# Patient Record
Sex: FEMALE | Race: ASIAN | NOT HISPANIC OR LATINO | Employment: OTHER | URBAN - METROPOLITAN AREA
[De-identification: names, ages, dates, MRNs, and addresses within clinical notes are randomized per-mention and may not be internally consistent; named-entity substitution may affect disease eponyms.]

---

## 2017-04-29 ENCOUNTER — APPOINTMENT (OUTPATIENT)
Dept: MRI IMAGING | Facility: CLINIC | Age: 52
End: 2017-04-29
Attending: EMERGENCY MEDICINE
Payer: COMMERCIAL

## 2017-04-29 ENCOUNTER — HOSPITAL ENCOUNTER (EMERGENCY)
Facility: CLINIC | Age: 52
Discharge: HOME | End: 2017-04-29
Attending: EMERGENCY MEDICINE | Admitting: EMERGENCY MEDICINE
Payer: COMMERCIAL

## 2017-04-29 VITALS
TEMPERATURE: 97.6 F | RESPIRATION RATE: 16 BRPM | DIASTOLIC BLOOD PRESSURE: 50 MMHG | OXYGEN SATURATION: 95 % | HEART RATE: 84 BPM | SYSTOLIC BLOOD PRESSURE: 97 MMHG

## 2017-04-29 DIAGNOSIS — R42 VERTIGO: ICD-10-CM

## 2017-04-29 DIAGNOSIS — H72.92 PERFORATION OF TYMPANIC MEMBRANE, LEFT: ICD-10-CM

## 2017-04-29 LAB
ANION GAP SERPL CALCULATED.3IONS-SCNC: 8 MMOL/L (ref 3–14)
BASOPHILS # BLD AUTO: 0 10E9/L (ref 0–0.2)
BASOPHILS NFR BLD AUTO: 0.2 %
BUN SERPL-MCNC: 12 MG/DL (ref 7–30)
CALCIUM SERPL-MCNC: 8.9 MG/DL (ref 8.5–10.1)
CHLORIDE SERPL-SCNC: 108 MMOL/L (ref 94–109)
CO2 SERPL-SCNC: 26 MMOL/L (ref 20–32)
CREAT SERPL-MCNC: 0.45 MG/DL (ref 0.52–1.04)
DIFFERENTIAL METHOD BLD: ABNORMAL
EOSINOPHIL # BLD AUTO: 0 10E9/L (ref 0–0.7)
EOSINOPHIL NFR BLD AUTO: 0.2 %
ERYTHROCYTE [DISTWIDTH] IN BLOOD BY AUTOMATED COUNT: 12.5 % (ref 10–15)
GFR SERPL CREATININE-BSD FRML MDRD: ABNORMAL ML/MIN/1.7M2
GLUCOSE SERPL-MCNC: 124 MG/DL (ref 70–99)
HCT VFR BLD AUTO: 38.9 % (ref 35–47)
HGB BLD-MCNC: 13.7 G/DL (ref 11.7–15.7)
IMM GRANULOCYTES # BLD: 0 10E9/L (ref 0–0.4)
IMM GRANULOCYTES NFR BLD: 0.3 %
LYMPHOCYTES # BLD AUTO: 1.3 10E9/L (ref 0.8–5.3)
LYMPHOCYTES NFR BLD AUTO: 22.2 %
MCH RBC QN AUTO: 33.5 PG (ref 26.5–33)
MCHC RBC AUTO-ENTMCNC: 35.2 G/DL (ref 31.5–36.5)
MCV RBC AUTO: 95 FL (ref 78–100)
MONOCYTES # BLD AUTO: 0.2 10E9/L (ref 0–1.3)
MONOCYTES NFR BLD AUTO: 4 %
NEUTROPHILS # BLD AUTO: 4.4 10E9/L (ref 1.6–8.3)
NEUTROPHILS NFR BLD AUTO: 73.1 %
NRBC # BLD AUTO: 0 10*3/UL
NRBC BLD AUTO-RTO: 0 /100
PLATELET # BLD AUTO: 223 10E9/L (ref 150–450)
POTASSIUM SERPL-SCNC: 3.7 MMOL/L (ref 3.4–5.3)
RBC # BLD AUTO: 4.09 10E12/L (ref 3.8–5.2)
SODIUM SERPL-SCNC: 142 MMOL/L (ref 133–144)
WBC # BLD AUTO: 6 10E9/L (ref 4–11)

## 2017-04-29 PROCEDURE — 99285 EMERGENCY DEPT VISIT HI MDM: CPT | Mod: 25

## 2017-04-29 PROCEDURE — 25000132 ZZH RX MED GY IP 250 OP 250 PS 637: Performed by: EMERGENCY MEDICINE

## 2017-04-29 PROCEDURE — 25000128 H RX IP 250 OP 636: Performed by: EMERGENCY MEDICINE

## 2017-04-29 PROCEDURE — 85025 COMPLETE CBC W/AUTO DIFF WBC: CPT | Performed by: EMERGENCY MEDICINE

## 2017-04-29 PROCEDURE — 70549 MR ANGIOGRAPH NECK W/O&W/DYE: CPT

## 2017-04-29 PROCEDURE — 96361 HYDRATE IV INFUSION ADD-ON: CPT

## 2017-04-29 PROCEDURE — 80048 BASIC METABOLIC PNL TOTAL CA: CPT | Performed by: EMERGENCY MEDICINE

## 2017-04-29 PROCEDURE — A9585 GADOBUTROL INJECTION: HCPCS | Performed by: EMERGENCY MEDICINE

## 2017-04-29 PROCEDURE — 70553 MRI BRAIN STEM W/O & W/DYE: CPT

## 2017-04-29 PROCEDURE — 25500064 ZZH RX 255 OP 636: Performed by: EMERGENCY MEDICINE

## 2017-04-29 PROCEDURE — 25000125 ZZHC RX 250: Performed by: EMERGENCY MEDICINE

## 2017-04-29 PROCEDURE — 96374 THER/PROPH/DIAG INJ IV PUSH: CPT | Mod: 59

## 2017-04-29 PROCEDURE — 70544 MR ANGIOGRAPHY HEAD W/O DYE: CPT | Mod: XS

## 2017-04-29 PROCEDURE — 96375 TX/PRO/DX INJ NEW DRUG ADDON: CPT

## 2017-04-29 PROCEDURE — 25000131 ZZH RX MED GY IP 250 OP 636 PS 637: Performed by: EMERGENCY MEDICINE

## 2017-04-29 RX ORDER — GADOBUTROL 604.72 MG/ML
10 INJECTION INTRAVENOUS ONCE
Status: COMPLETED | OUTPATIENT
Start: 2017-04-29 | End: 2017-04-29

## 2017-04-29 RX ORDER — DIAZEPAM 10 MG/2ML
2.5 INJECTION, SOLUTION INTRAMUSCULAR; INTRAVENOUS ONCE
Status: COMPLETED | OUTPATIENT
Start: 2017-04-29 | End: 2017-04-29

## 2017-04-29 RX ORDER — ONDANSETRON 4 MG/1
4 TABLET, ORALLY DISINTEGRATING ORAL EVERY 6 HOURS PRN
Qty: 10 TABLET | Refills: 0 | Status: SHIPPED | OUTPATIENT
Start: 2017-04-29 | End: 2017-05-02

## 2017-04-29 RX ORDER — MECLIZINE HYDROCHLORIDE 25 MG/1
25 TABLET ORAL ONCE
Status: COMPLETED | OUTPATIENT
Start: 2017-04-29 | End: 2017-04-29

## 2017-04-29 RX ORDER — DIAZEPAM 5 MG
5 TABLET ORAL EVERY 6 HOURS PRN
Qty: 20 TABLET | Refills: 0 | Status: SHIPPED | OUTPATIENT
Start: 2017-04-29

## 2017-04-29 RX ORDER — SCOLOPAMINE TRANSDERMAL SYSTEM 1 MG/1
1 PATCH, EXTENDED RELEASE TRANSDERMAL
Status: COMPLETED | OUTPATIENT
Start: 2017-04-29 | End: 2017-04-29

## 2017-04-29 RX ORDER — DIAZEPAM 5 MG
5 TABLET ORAL ONCE
Status: COMPLETED | OUTPATIENT
Start: 2017-04-29 | End: 2017-04-29

## 2017-04-29 RX ORDER — ONDANSETRON 2 MG/ML
4 INJECTION INTRAMUSCULAR; INTRAVENOUS ONCE
Status: COMPLETED | OUTPATIENT
Start: 2017-04-29 | End: 2017-04-29

## 2017-04-29 RX ADMIN — GADOBUTROL 10 ML: 604.72 INJECTION INTRAVENOUS at 18:25

## 2017-04-29 RX ADMIN — ONDANSETRON 4 MG: 2 SOLUTION INTRAMUSCULAR; INTRAVENOUS at 16:34

## 2017-04-29 RX ADMIN — DIAZEPAM 5 MG: 5 TABLET ORAL at 20:02

## 2017-04-29 RX ADMIN — DIAZEPAM 2.5 MG: 5 INJECTION, SOLUTION INTRAMUSCULAR; INTRAVENOUS at 16:39

## 2017-04-29 RX ADMIN — SODIUM CHLORIDE 1000 ML: 9 INJECTION, SOLUTION INTRAVENOUS at 16:34

## 2017-04-29 RX ADMIN — MECLIZINE HYDROCHLORIDE 25 MG: 25 TABLET ORAL at 19:16

## 2017-04-29 RX ADMIN — SCOPALAMINE 1 PATCH: 1 PATCH, EXTENDED RELEASE TRANSDERMAL at 21:03

## 2017-04-29 ASSESSMENT — ENCOUNTER SYMPTOMS
HEADACHES: 1
VOMITING: 1
SHORTNESS OF BREATH: 0
FEVER: 0
ABDOMINAL PAIN: 0
NAUSEA: 1
DIZZINESS: 1

## 2017-04-29 NOTE — ED AVS SNAPSHOT
Emergency Department    [address] [facility] [address]    Phone:  [phone]    Fax:  [phone]                                       Salinas Hoffmann   MRN: [id number]    Department:   Emergency Department   Date of Visit:  4/29/2017           Patient Information     Date Of Birth          1965        Your diagnoses for this visit were:     Vertigo     Perforation of tympanic membrane, left        You were seen by Ryan Erickson DO.      Follow-up Information     Follow up with Primary care doctor In 3 days.        Follow up with  Emergency Department.    Specialty:  EMERGENCY MEDICINE    Why:  If symptoms worsen    Contact information:    [address] [facility] [address] [phone]        Follow up with Nat Parkinson MD.    Specialty:  Otolaryngology    Why:  ENT doctor; call monday for appointment    Contact information:    [facility]  [address] [address]  [phone]          Discharge Instructions         Discharge Instructions  Vertigo  You have been diagnosed with vertigo.  This is a feeling that you are spinning or that the room is moving around you. You will often have nausea, vomiting, and balance problems with it.  Vertigo is usually caused by a problem in the inner ear which helps control your balance.  Many things can cause vertigo, including calcium collections in the inner ear, a virus infection of the inner ear, concussion, migraine, and some medicines.  Luckily, these causes are not life threatening and will eventually go away.  However, sometimes there is a serious problem that does not show up right away.  Return to the Emergency Department if you have:    New or severe headache.    Temperature greater than 100.4 F (38 C).    Seeing double or having trouble seeing clearly.    Trouble speaking or hearing.    Weakness in an arm or leg.    Passing out.    Numbness or tingling.    Chest  pain.    Vomiting that will not stop.    Treatment:    An antihistamine, such as Antivert  (meclizine), or non-prescription medicines like Dramamine  (dimenhydrinate), or Benadryl  (diphenhydramine).    Prescription anti-nausea medicines, such as Phenergan  (promethazine), Reglan  (metoclopramide), or Zofran  (ondansetron).    Prescription sedative medicines, such as Valium  (diazepam), Ativan  (lorazepam), or Klonopin  (clonazepam).    Most of these medicines make you sleepy, and you should not take them before you work or drive. You should only take prescription medicines to treat severe vertigo symptoms, and you should stop the medicine when your symptoms improve.    Follow Up:    If you have vertigo longer than three days, it is important that you follow up either with your primary doctor or an Ear Nose and Throat doctor.  You may need further testing to evaluate your vertigo and you may also need  vestibular  therapy which is a special form of physical therapy to make the vertigo go away.    If you were given a prescription for medicine here today, be sure to read all of the information (including the package insert) that comes with your prescription.  This will include important information about the medicine, its side effects, and any warnings that you need to know about.  The pharmacist who fills the prescription can provide more information and answer questions you may have about the medicine.  If you have questions or concerns that the pharmacist cannot address, please call or return to the Emergency Department.       Opioid Medication Information    Pain medications are among the most commonly prescribed medicines, so we are including this information for all our patients. If you did not receive pain medication or get a prescription for pain medicine, you can ignore it.     You may have been given a prescription for an opioid (narcotic) pain medicine and/or have received a pain medicine while here in the  Emergency Department. These medicines can make you drowsy or impaired. You must not drive, operate dangerous equipment, or engage in any other dangerous activities while taking these medications. If you drive while taking these medications, you could be arrested for DUI, or driving under the influence. Do not drink any alcohol while you are taking these medications.     Opioid pain medications can cause addiction. If you have a history of chemical dependency of any type, you are at a higher risk of becoming addicted to pain medications.  Only take these prescribed medications to treat your pain when all other options have been tried. Take it for as short a time and as few doses as possible. Store your pain pills in a secure place, as they are frequently stolen and provide a dangerous opportunity for children or visitors in your house to start abusing these powerful medications. We will not replace any lost or stolen medicine.  As soon as your pain is better, you should flush all your remaining medication.     Many prescription pain medications contain Tylenol  (acetaminophen), including Vicodin , Tylenol #3 , Norco , Lortab , and Percocet .  You should not take any extra pills of Tylenol  if you are using these prescription medications or you can get very sick.  Do not ever take more than 3000 mg of acetaminophen in any 24 hour period.    All opioids tend to cause constipation. Drink plenty of water and eat foods that have a lot of fiber, such as fruits, vegetables, prune juice, apple juice and high fiber cereal.  Take a laxative if you don t move your bowels at least every other day. Miralax , Milk of Magnesia, Colace , or Senna  can be used to keep you regular.      Remember that you can always come back to the Emergency Department if you are not able to see your regular doctor in the amount of time listed above, if you get any new symptoms, or if there is anything that worries you.    Return to the emergency  department or seek medical care as instructed if your symptoms fail to improve or significantly worsen.    Take Valium symptom relief; use as directed.    Remove scopolamine patch in 72 hours.     Take zofran as need for nausea; use as directed.    Follow-up as indicated on page 1.  Maintain adequate hydration and get plenty of rest.        24 Hour Appointment Hotline       To make an appointment at any [facility], call [phone] ([phone]). If you don't have a family doctor or clinic, we will help you find one. [facility] are conveniently located to serve the needs of you and your family.          ED Discharge Orders     PHYSICAL THERAPY REFERRAL                    Review of your medicines      START taking        Dose / Directions Last dose taken    diazepam 5 MG tablet   Commonly known as:  VALIUM   Dose:  5 mg   Quantity:  20 tablet        Take 1 tablet (5 mg) by mouth every 6 hours as needed (vertigo)   Refills:  0        ondansetron 4 MG ODT tab   Commonly known as:  ZOFRAN ODT   Dose:  4 mg   Quantity:  10 tablet        Take 1 tablet (4 mg) by mouth every 6 hours as needed for nausea or vomiting   Refills:  0          Our records show that you are taking the medicines listed below. If these are incorrect, please call your family doctor or clinic.        Dose / Directions Last dose taken    HYDROcodone-acetaminophen 5-325 MG per tablet   Commonly known as:  NORCO   Dose:  1-2 tablet   Quantity:  20 tablet        Take 1-2 tablets by mouth every 4 hours as needed for pain   Refills:  0        ibuprofen 600 MG tablet   Commonly known as:  ADVIL/MOTRIN   Dose:  600 mg   Quantity:  30 tablet        Take 1 tablet (600 mg) by mouth every 6 hours as needed for moderate pain   Refills:  1                Prescriptions were sent or printed at these locations (2 Prescriptions)                   Other Prescriptions                Printed at Department/Unit printer (2 of 2)         diazepam (VALIUM) 5  MG tablet               ondansetron (ZOFRAN ODT) 4 MG ODT tab                Procedures and tests performed during your visit     Basic metabolic panel    CBC with platelets + differential    Head MRA w/o contrast - STROKE PROTOCOL    MR Brain w/o & w Contrast    Neck MRA w & w/o contrast - STROKE PROTOCOL      Orders Needing Specimen Collection     None      Pending Results     No orders found from 4/27/2017 to 4/30/2017.            Pending Culture Results     No orders found from 4/27/2017 to 4/30/2017.            Test Results From Your Hospital Stay        4/29/2017  7:28 PM      Narrative     MR BRAIN W/O & W CONTRAST  4/29/2017 6:24 PM     HISTORY:  vertigo    TECHNIQUE: Multiplanar, multisequence MRI of the brain without and  with 10 mL Gadavist IV contrast material.    COMPARISON: None.    FINDINGS: Patient's dental work is causing artifact over the anterior  face.  The brain parenchyma, ventricles and subarachnoid spaces appear  normal for age.  There is no evidence of hemorrhage, mass, acute  infarct, or anomaly. There are no gadolinium enhancing lesions.  The  facial structures appear normal.  The arteries at the base of the  brain and the dural venous sinuses appear patent.  No abnormal inner  ear enhancement is seen on this study.        Impression     IMPRESSION: Negative MR brain. No structural abnormalities are  identified.     LIEN SANTO MD         4/29/2017  7:31 PM      Narrative     MRA ANGIOGRAM HEAD W/O CONTRAST   4/29/2017 6:24 PM     HISTORY:  vertigo    TECHNIQUE:  3D time-of-flight MR angiogram of the head without  contrast.    COMPARISON: None.    FINDINGS: The visualized portions of the distal internal carotid and  vertebral arteries, the basilar artery, [place], and the  proximal anterior, middle and posterior cerebral arteries all appear  normal. There is no evidence of aneurysm or vascular stenosis or  occlusion.        Impression     IMPRESSION:  Negative MR angiogram of the  head. No occluded vessels.  No high-grade intracranial vascular stenosis.    LIEN SANTO MD         4/29/2017  7:31 PM      Narrative     MRA ANGIOGRAM NECK W/O & W CONTRAST 4/29/2017 6:25 PM     HISTORY:  vertigo    TECHNIQUE:  Sequential axial images of the neck were obtained using  2-dimensional time-of-flight before contrast and 3-dimensional  time-of-flight after the uneventful administration of 10 mL Gadavist  iv contrast.    COMPARISON:  None.    FINDINGS: Stenosis is relative to the distal internal carotid  diameter.    Right Carotid:  No significant stenosis is seen at the bifurcation  relative to the distal internal carotid diameter.    Left Carotid:  No significant stenosis is seen at the bifurcation  relative to the distal internal carotid diameter.    Vertebrals: Antegrade flow is seen in both vertebral arteries.    No arterial dissection is identified.        Impression     IMPRESSION: Negative MR angiogram of the neck vessels. No stenosis is  seen at either carotid bifurcation.    LIEN SANTO MD         4/29/2017  4:41 PM      Component Results     Component Value Ref Range & Units Status    WBC 6.0 4.0 - 11.0 10e9/L Final    RBC Count 4.09 3.8 - 5.2 10e12/L Final    Hemoglobin 13.7 11.7 - 15.7 g/dL Final    Hematocrit 38.9 35.0 - 47.0 % Final    MCV 95 78 - 100 fl Final    MCH 33.5 (H) 26.5 - 33.0 pg Final    MCHC 35.2 31.5 - 36.5 g/dL Final    RDW 12.5 10.0 - 15.0 % Final    Platelet Count 223 150 - 450 10e9/L Final    Diff Method Automated Method  Final    % Neutrophils 73.1 % Final    % Lymphocytes 22.2 % Final    % Monocytes 4.0 % Final    % Eosinophils 0.2 % Final    % Basophils 0.2 % Final    % Immature Granulocytes 0.3 % Final    Nucleated RBCs 0 0 /100 Final    Absolute Neutrophil 4.4 1.6 - 8.3 10e9/L Final    Absolute Lymphocytes 1.3 0.8 - 5.3 10e9/L Final    Absolute Monocytes 0.2 0.0 - 1.3 10e9/L Final    Absolute Eosinophils 0.0 0.0 - 0.7 10e9/L Final    Absolute Basophils 0.0 0.0 - 0.2  10e9/L Final    Abs Immature Granulocytes 0.0 0 - 0.4 10e9/L Final    Absolute Nucleated RBC 0.0  Final         4/29/2017  5:00 PM      Component Results     Component Value Ref Range & Units Status    Sodium 142 133 - 144 mmol/L Final    Potassium 3.7 3.4 - 5.3 mmol/L Final    Chloride 108 94 - 109 mmol/L Final    Carbon Dioxide 26 20 - 32 mmol/L Final    Anion Gap 8 3 - 14 mmol/L Final    Glucose 124 (H) 70 - 99 mg/dL Final    Urea Nitrogen 12 7 - 30 mg/dL Final    Creatinine 0.45 (L) 0.52 - 1.04 mg/dL Final    GFR Estimate >90  Non  GFR Calc   >60 mL/min/1.7m2 Final    GFR Estimate If Black >90   GFR Calc   >60 mL/min/1.7m2 Final    Calcium 8.9 8.5 - 10.1 mg/dL Final                Clinical Quality Measure: Blood Pressure Screening     Your blood pressure was checked while you were in the emergency department today. The last reading we obtained was  BP: 115/63 . Please read the guidelines below about what these numbers mean and what you should do about them.  If your systolic blood pressure (the top number) is less than 120 and your diastolic blood pressure (the bottom number) is less than 80, then your blood pressure is normal. There is nothing more that you need to do about it.  If your systolic blood pressure (the top number) is 120-139 or your diastolic blood pressure (the bottom number) is 80-89, your blood pressure may be higher than it should be. You should have your blood pressure rechecked within a year by a primary care provider.  If your systolic blood pressure (the top number) is 140 or greater or your diastolic blood pressure (the bottom number) is 90 or greater, you may have high blood pressure. High blood pressure is treatable, but if left untreated over time it can put you at risk for heart attack, stroke, or kidney failure. You should have your blood pressure rechecked by a primary care provider within the next 4 weeks.  If your provider in the emergency department  "today gave you specific instructions to follow-up with your doctor or provider even sooner than that, you should follow that instruction and not wait for up to 4 weeks for your follow-up visit.        Thank you for choosing [facility]       Thank you for choosing [facility] for your care. Our goal is always to provide you with excellent care. Hearing back from our patients is one way we can continue to improve our services. Please take a few minutes to complete the written survey that you may receive in the mail after you visit with us. Thank you!        [facility] Information     [employer] lets you send messages to your doctor, view your test results, renew your prescriptions, schedule appointments and more. To sign up, go to [email or web address] . Click on \"Log in\" on the left side of the screen, which will take you to the Welcome page. Then click on \"Sign up Now\" on the right side of the page.     You will be asked to enter the access code listed below, as well as some personal information. Please follow the directions to create your username and password.     Your access code is: [email or web address]  Expires: 2017  9:28 PM     Your access code will  in 90 days. If you need help or a new code, please call your [facility] clinic or [phone].        Care EveryWhere ID     This is your Care EveryWhere ID. This could be used by other organizations to access your [facility] medical records  [id number]        After Visit Summary       This is your record. Keep this with you and show to your community pharmacist(s) and doctor(s) at your next visit.                  "

## 2017-04-29 NOTE — ED PROVIDER NOTES
"  History     Chief Complaint:    Dizziness     HPI History limited secondary to a language barrier. The patient's daughter served as a .     Salinas Hoffmann is a 51 year old female who presents with dizziness. The patient had a sudden onset bout of \"room is spinning\" dizziness yesterday at 1600 followed by vomiting; this eventually resolved and she was able to fall asleep. This morning at 0630 her dizziness and nausea resumed and have been present since; worse with movement and looking around the room; reports severe vertigo. She also reports a mild headache. The patient has never had this before. She denies fever, recent illness, chest pain, shortness of breath, or abdominal pain. Of note, the patient states that she stuck a Q-tip in her left ear when she was a child and has had decreased hearing in that ear since; she denies recent ear injury.       Allergies:  No known drug allergies.     Medications:    Ibuprofen   Norco      Past Medical History:    Gallstone     Past Surgical History:    Retinal reattachment     Family History:    Stomach cancer - Father     Social History:  Marital Status:   Presents to the ED with her daughter and son in law  Tobacco Use: never smoker  Alcohol Use: No  PCP: [facility]      Review of Systems   Constitutional: Negative for fever.   Respiratory: Negative for shortness of breath.    Cardiovascular: Negative for chest pain.   Gastrointestinal: Positive for nausea and vomiting. Negative for abdominal pain.   Neurological: Positive for dizziness and headaches.   All other systems reviewed and are negative.        Physical Exam   First Vitals:  BP: 125/66  Pulse: 84  Temp: 97.6  F (36.4  C)  Resp: 16  SpO2: 95 %    Physical Exam     General: Alert and unable to fully cooperate with exam. Patient in moderate to severe distress. Normal mentation  Head:  Scalp is NC/AT  Eyes:  No scleral icterus, PERRL, EOMI   ENT:  The external nose and ears are normal. Likely " chronic left TM perforation, no evidence of infection. Right TM normal. The oropharynx is normal and without erythema; mucus membranes are moist. Uvula midline, no evidence of deep space infection.  Neck:  Normal range of motion without rigidity.   CV:  Regular rate and rhythm    No pathologic murmur   Resp:  Breath sounds are clear bilaterally    Non-labored, no retractions or accessory muscle use  GI:  Abdomen is soft, no distension, no tenderness.   MS:  No lower extremity edema   Skin:  Warm and dry, No rash or lesions noted.  Neuro: Oriented x 3. No gross motor deficits.     Strength and sensation grossly intact in all 4 extremities.       Cranial nerves 2-12 intact.    GCS: 15    Positive head impulse test    Left beating horizontal nystagmus         Emergency Department Course     Imaging:  Neck MRA w & w/o contrast - STROKE PROTOCOL  Negative MR angiogram of the neck vessels. No stenosis is  seen at either carotid bifurcation.  Report per radiology.     Head MRA w/o contrast - STROKE PROTOCOL  Negative MR angiogram of the head. No occluded vessels.  No high-grade intracranial vascular stenosis.  Report per radiology.     MR Brain w/o & w Contrast  Negative MR brain. No structural abnormalities are  identified.  Report per radiology.     Radiographic findings were communicated with the patient who voiced understanding of the findings.     Laboratory:  CBC:  WBC 6.0, HGB 13.7,     BMP: Glucose 124 (H), Creatinine 0.45 (L), otherwise WNL     Interventions:  (1634) Normal Saline, 1 liter, IV bolus    (1634) Zofran, 4 mg, IV injection   (1639) Valium, 2.5 mg, PO   (1825) Gadavist, 10 mL, IV   (1916) Meclizine, 25 mg, PO   (2002) Valium, 5 mg, PO   (2103) Scopolamine (TRANSDERM) 1 MG/3 DAYS 72 hr patch 1 patch.    Emergency Department Course:  Nursing notes and vitals reviewed.  I performed an exam of the patient as documented above.   IV inserted.   Blood was drawn from the patient. This was sent for  laboratory testing, findings above.    The patient was sent for a head and Neck MRI/MRA while in the emergency department, findings above.     (1541) I rechecked on the patient.   (1848) I rechecked on the patient.   (1936) I rechecked on the patient.   (2043) Findings and plan explained to the patient. Patient discharged home with instructions regarding supportive care, medications, and reasons to return. The importance of close follow-up was reviewed. The patient was prescribed valium and zofran.    Impression & Plan      Medical Decision Making:  Patient is a 51 year old female who presents with sudden onset vertigo with associated nausea. Patient's medical history and records were reviewed. Initial consideration for, but not limited to, central versus peripheral cause of vertigo. Labs and imaging were obtained. Labs unremarkable as noted above. MRI/MRA of the head and neck obtained as patient was unable to fully comply with exam on initial presentation and there was a significant language barrier on exam. Imaging negative as noted above. Patient received repeated doses of valium, a scopolamine patch, and meclizine with noted improvement. Patient noted most improvement with valium. Presentation at this time most consistent with peripheral vertigo. 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 No history of fever or ear drainage. Give patient's vertiginous symptoms, I recommended close follow up with ENT and made referral for outpatient vestibular rehab. Prescribed valium and zofran for symptom release and patient was sent home with scopolamine patch in place to be removed in 72 hours. Recommended close follow up with PCP. Return precautions were discussed. Patient was able to ambulate in the ED without the need for assistance and symptoms were well controlled. Patient was discharged to home in good/stable condition. Vital signs remained  within normal range throughout ED encounter.       Diagnosis:    ICD-10-CM    1. Vertigo R42 PHYSICAL THERAPY REFERRAL   2. Perforation of tympanic membrane, left H72.92        Disposition:  discharged to home    Discharge Medications:  Discharge Medication List as of 4/29/2017  9:32 PM      START taking these medications    Details   diazepam (VALIUM) 5 MG tablet Take 1 tablet (5 mg) by mouth every 6 hours as needed (vertigo), Disp-20 tablet, R-0, Local Print      ondansetron (ZOFRAN ODT) 4 MG ODT tab Take 1 tablet (4 mg) by mouth every 6 hours as needed for nausea or vomiting, Disp-10 tablet, R-0, Local Print             Fredrick WEST am serving as a scribe on 4/29/2017 at 4:07 PM to personally document services performed by Dr. Erickson based on my observations and the provider's statements to me.     4/29/2017    EMERGENCY DEPARTMENT       Ryan Erickson DO  04/30/17 161

## 2017-04-29 NOTE — ED AVS SNAPSHOT
Emergency Department    [address] [place] [address]    Phone:  [phone]    Fax:  [phone]                                       Salinas Hoffmann   MRN: [id number]    Department:   Emergency Department   Date of Visit:  4/29/2017           After Visit Summary Signature Page     I have received my discharge instructions, and my questions have been answered. I have discussed any challenges I see with this plan with the nurse or doctor.    ..........................................................................................................................................  Patient/Patient Representative Signature      ..........................................................................................................................................  Patient Representative Print Name and Relationship to Patient    ..................................................               ................................................  Date                                            Time    ..........................................................................................................................................  Reviewed by Signature/Title    ...................................................              ..............................................  Date                                                            Time

## 2017-04-30 NOTE — ED NOTES
Pt. Continues to be dizzy with eye opening. Pt. Slow with gait. Discharge instructions given with verbal understanding. Clinic information given to pt daughter also.

## 2017-04-30 NOTE — DISCHARGE INSTRUCTIONS
Discharge Instructions  Vertigo  You have been diagnosed with vertigo.  This is a feeling that you are spinning or that the room is moving around you. You will often have nausea, vomiting, and balance problems with it.  Vertigo is usually caused by a problem in the inner ear which helps control your balance.  Many things can cause vertigo, including calcium collections in the inner ear, a virus infection of the inner ear, concussion, migraine, and some medicines.  Luckily, these causes are not life threatening and will eventually go away.  However, sometimes there is a serious problem that does not show up right away.  Return to the Emergency Department if you have:    New or severe headache.    Temperature greater than 100.4 F (38 C).    Seeing double or having trouble seeing clearly.    Trouble speaking or hearing.    Weakness in an arm or leg.    Passing out.    Numbness or tingling.    Chest pain.    Vomiting that will not stop.    Treatment:    An antihistamine, such as Antivert  (meclizine), or non-prescription medicines like Dramamine  (dimenhydrinate), or Benadryl  (diphenhydramine).    Prescription anti-nausea medicines, such as Phenergan  (promethazine), Reglan  (metoclopramide), or Zofran  (ondansetron).    Prescription sedative medicines, such as Valium  (diazepam), Ativan  (lorazepam), or Klonopin  (clonazepam).    Most of these medicines make you sleepy, and you should not take them before you work or drive. You should only take prescription medicines to treat severe vertigo symptoms, and you should stop the medicine when your symptoms improve.    Follow Up:    If you have vertigo longer than three days, it is important that you follow up either with your primary doctor or an Ear Nose and Throat doctor.  You may need further testing to evaluate your vertigo and you may also need  vestibular  therapy which is a special form of physical therapy to make the vertigo go away.    If you were given a  prescription for medicine here today, be sure to read all of the information (including the package insert) that comes with your prescription.  This will include important information about the medicine, its side effects, and any warnings that you need to know about.  The pharmacist who fills the prescription can provide more information and answer questions you may have about the medicine.  If you have questions or concerns that the pharmacist cannot address, please call or return to the Emergency Department.       Opioid Medication Information    Pain medications are among the most commonly prescribed medicines, so we are including this information for all our patients. If you did not receive pain medication or get a prescription for pain medicine, you can ignore it.     You may have been given a prescription for an opioid (narcotic) pain medicine and/or have received a pain medicine while here in the Emergency Department. These medicines can make you drowsy or impaired. You must not drive, operate dangerous equipment, or engage in any other dangerous activities while taking these medications. If you drive while taking these medications, you could be arrested for DUI, or driving under the influence. Do not drink any alcohol while you are taking these medications.     Opioid pain medications can cause addiction. If you have a history of chemical dependency of any type, you are at a higher risk of becoming addicted to pain medications.  Only take these prescribed medications to treat your pain when all other options have been tried. Take it for as short a time and as few doses as possible. Store your pain pills in a secure place, as they are frequently stolen and provide a dangerous opportunity for children or visitors in your house to start abusing these powerful medications. We will not replace any lost or stolen medicine.  As soon as your pain is better, you should flush all your remaining medication.     Many  prescription pain medications contain Tylenol  (acetaminophen), including Vicodin , Tylenol #3 , Norco , Lortab , and Percocet .  You should not take any extra pills of Tylenol  if you are using these prescription medications or you can get very sick.  Do not ever take more than 3000 mg of acetaminophen in any 24 hour period.    All opioids tend to cause constipation. Drink plenty of water and eat foods that have a lot of fiber, such as fruits, vegetables, prune juice, apple juice and high fiber cereal.  Take a laxative if you don t move your bowels at least every other day. Miralax , Milk of Magnesia, Colace , or Senna  can be used to keep you regular.      Remember that you can always come back to the Emergency Department if you are not able to see your regular doctor in the amount of time listed above, if you get any new symptoms, or if there is anything that worries you.    Return to the emergency department or seek medical care as instructed if your symptoms fail to improve or significantly worsen.    Take Valium symptom relief; use as directed.    Remove scopolamine patch in 72 hours.     Take zofran as need for nausea; use as directed.    Follow-up as indicated on page 1.  Maintain adequate hydration and get plenty of rest.

## 2018-06-05 DIAGNOSIS — B18.1 CHRONIC VIRAL HEPATITIS B WITHOUT DELTA AGENT AND WITHOUT COMA (H): Primary | ICD-10-CM

## 2019-01-28 ENCOUNTER — DOCUMENTATION ONLY (OUTPATIENT)
Dept: CARE COORDINATION | Facility: CLINIC | Age: 54
End: 2019-01-28

## 2019-12-31 ENCOUNTER — TELEPHONE (OUTPATIENT)
Dept: GASTROENTEROLOGY | Facility: CLINIC | Age: 54
End: 2019-12-31

## 2019-12-31 DIAGNOSIS — B18.1 CHRONIC VIRAL HEPATITIS B WITHOUT DELTA AGENT AND WITHOUT COMA (H): Primary | ICD-10-CM

## 2020-01-02 ENCOUNTER — OFFICE VISIT (OUTPATIENT)
Dept: GASTROENTEROLOGY | Facility: CLINIC | Age: 55
End: 2020-01-02
Attending: INTERNAL MEDICINE
Payer: MEDICARE

## 2020-01-02 VITALS
WEIGHT: 122.8 LBS | HEIGHT: 62 IN | HEART RATE: 72 BPM | DIASTOLIC BLOOD PRESSURE: 79 MMHG | OXYGEN SATURATION: 97 % | BODY MASS INDEX: 22.6 KG/M2 | TEMPERATURE: 97.7 F | SYSTOLIC BLOOD PRESSURE: 126 MMHG

## 2020-01-02 DIAGNOSIS — B18.1 CHRONIC VIRAL HEPATITIS B WITHOUT DELTA AGENT AND WITHOUT COMA (H): Primary | ICD-10-CM

## 2020-01-02 DIAGNOSIS — B18.1 CHRONIC VIRAL HEPATITIS B WITHOUT DELTA AGENT AND WITHOUT COMA (H): ICD-10-CM

## 2020-01-02 LAB
ALBUMIN SERPL-MCNC: 3.8 G/DL (ref 3.4–5)
ALP SERPL-CCNC: 95 U/L (ref 40–150)
ALT SERPL W P-5'-P-CCNC: 22 U/L (ref 0–50)
ANION GAP SERPL CALCULATED.3IONS-SCNC: 3 MMOL/L (ref 3–14)
AST SERPL W P-5'-P-CCNC: 20 U/L (ref 0–45)
BILIRUB DIRECT SERPL-MCNC: 0.1 MG/DL (ref 0–0.2)
BILIRUB SERPL-MCNC: 0.6 MG/DL (ref 0.2–1.3)
BUN SERPL-MCNC: 9 MG/DL (ref 7–30)
CALCIUM SERPL-MCNC: 8.9 MG/DL (ref 8.5–10.1)
CHLORIDE SERPL-SCNC: 111 MMOL/L (ref 94–109)
CO2 SERPL-SCNC: 29 MMOL/L (ref 20–32)
CREAT SERPL-MCNC: 0.46 MG/DL (ref 0.52–1.04)
ERYTHROCYTE [DISTWIDTH] IN BLOOD BY AUTOMATED COUNT: 12.2 % (ref 10–15)
GFR SERPL CREATININE-BSD FRML MDRD: >90 ML/MIN/{1.73_M2}
GLUCOSE SERPL-MCNC: 88 MG/DL (ref 70–99)
HCT VFR BLD AUTO: 40.5 % (ref 35–47)
HGB BLD-MCNC: 13.4 G/DL (ref 11.7–15.7)
INR PPP: 1 (ref 0.86–1.14)
MCH RBC QN AUTO: 32.6 PG (ref 26.5–33)
MCHC RBC AUTO-ENTMCNC: 33.1 G/DL (ref 31.5–36.5)
MCV RBC AUTO: 99 FL (ref 78–100)
PLATELET # BLD AUTO: 249 10E9/L (ref 150–450)
POTASSIUM SERPL-SCNC: 3.7 MMOL/L (ref 3.4–5.3)
PROT SERPL-MCNC: 7.9 G/DL (ref 6.8–8.8)
RBC # BLD AUTO: 4.11 10E12/L (ref 3.8–5.2)
SODIUM SERPL-SCNC: 142 MMOL/L (ref 133–144)
WBC # BLD AUTO: 4.4 10E9/L (ref 4–11)

## 2020-01-02 PROCEDURE — G0463 HOSPITAL OUTPT CLINIC VISIT: HCPCS | Mod: ZF

## 2020-01-02 PROCEDURE — 80076 HEPATIC FUNCTION PANEL: CPT | Performed by: INTERNAL MEDICINE

## 2020-01-02 PROCEDURE — 36415 COLL VENOUS BLD VENIPUNCTURE: CPT | Performed by: INTERNAL MEDICINE

## 2020-01-02 PROCEDURE — 80048 BASIC METABOLIC PNL TOTAL CA: CPT | Performed by: INTERNAL MEDICINE

## 2020-01-02 PROCEDURE — 87517 HEPATITIS B DNA QUANT: CPT | Performed by: INTERNAL MEDICINE

## 2020-01-02 PROCEDURE — 85027 COMPLETE CBC AUTOMATED: CPT | Performed by: INTERNAL MEDICINE

## 2020-01-02 PROCEDURE — 85610 PROTHROMBIN TIME: CPT | Performed by: INTERNAL MEDICINE

## 2020-01-02 ASSESSMENT — MIFFLIN-ST. JEOR: SCORE: 1100.27

## 2020-01-02 ASSESSMENT — PAIN SCALES - GENERAL: PAINLEVEL: NO PAIN (0)

## 2020-01-02 NOTE — LETTER
"1/2/2020      RE: Mariana Hoffmann  [id number]  [facility] [address]       I had the pleasure of seeing MARIANA Hoffmann for followup in the Liver Clinic at the [facility] on 01/02/2020.      Ms. Hoffmann returns for followup of chronic hepatitis B.  It has been 3 years since I saw her last in spite of the discussion with her that she needed to be seen every 6 months for ultrasound screening and blood work.  At that time she was in the immune inactive phase of hepatitis B.      She complains of what she describes as indigestion.  There is a language problem and unfortunately, her  could not speak her dialect.  Her daughter was there to help with translation, but it was very unclear what she meant by indigestion.  She did kind of point to her epigastrium when she talked about it.  Its relationship to meals could not really be established, whether it improved things or made things worse.  She tends to be a little bit constipated.  She reports her appetite remains good, and her weight has remained stable, although her weight has remained stable with her eating less.      She denies any right upper quadrant pain, itching or skin rash or fatigue.  She denies any fevers or chills, cough or shortness of breath.  She denies any nausea or vomiting.  Otherwise, there have been no other new events since she was last seen.     Current Outpatient Medications   Medication     diazepam (VALIUM) 5 MG tablet     HYDROcodone-acetaminophen (NORCO) 5-325 MG per tablet     ibuprofen (ADVIL,MOTRIN) 600 MG tablet     No current facility-administered medications for this visit.      /79   Pulse 72   Temp 97.7  F (36.5  C) (Oral)   Ht 1.575 m (5' 2\")   Wt 55.7 kg (122 lb 12.8 oz)   LMP 03/09/2013   SpO2 97%   BMI 22.46 kg/m       PHYSICAL EXAMINATION:  In general, she looks well.  HEENT exam shows no scleral icterus or temporal muscle wasting.  Her chest is clear.  Abdominal exam shows no increase in " girth.  No masses or tenderness to palpation are present.  Her liver is 10 cm in span without left lobe enlargement.  No spleen tip is palpable, and extremity exam shows no edema.  Skin exam shows no stigmata of chronic liver disease.  Neurologic exam shows no asterixis.     Recent Results (from the past 168 hour(s))   INR    Collection Time: 01/02/20  8:30 AM   Result Value Ref Range    INR 1.00 0.86 - 1.14   Hepatic panel    Collection Time: 01/02/20  8:30 AM   Result Value Ref Range    Bilirubin Direct 0.1 0.0 - 0.2 mg/dL    Bilirubin Total 0.6 0.2 - 1.3 mg/dL    Albumin 3.8 3.4 - 5.0 g/dL    Protein Total 7.9 6.8 - 8.8 g/dL    Alkaline Phosphatase 95 40 - 150 U/L    ALT 22 0 - 50 U/L    AST 20 0 - 45 U/L   Basic metabolic panel    Collection Time: 01/02/20  8:30 AM   Result Value Ref Range    Sodium 142 133 - 144 mmol/L    Potassium 3.7 3.4 - 5.3 mmol/L    Chloride 111 (H) 94 - 109 mmol/L    Carbon Dioxide 29 20 - 32 mmol/L    Anion Gap 3 3 - 14 mmol/L    Glucose 88 70 - 99 mg/dL    Urea Nitrogen 9 7 - 30 mg/dL    Creatinine 0.46 (L) 0.52 - 1.04 mg/dL    GFR Estimate >90 >60 mL/min/[1.73_m2]    GFR Estimate If Black >90 >60 mL/min/[1.73_m2]    Calcium 8.9 8.5 - 10.1 mg/dL   CBC with platelets    Collection Time: 01/02/20  8:30 AM   Result Value Ref Range    WBC 4.4 4.0 - 11.0 10e9/L    RBC Count 4.11 3.8 - 5.2 10e12/L    Hemoglobin 13.4 11.7 - 15.7 g/dL    Hematocrit 40.5 35.0 - 47.0 %    MCV 99 78 - 100 fl    MCH 32.6 26.5 - 33.0 pg    MCHC 33.1 31.5 - 36.5 g/dL    RDW 12.2 10.0 - 15.0 %    Platelet Count 249 150 - 450 10e9/L      My impression is that Ms. Hoffmann has chronic hepatitis B.  She does need an ultrasound to screen for hepatocellular carcinoma.  She will need to return in 6 months for repeat ultrasound and blood work.  I rediscussed with her and her daughter the rationale for this.  Her HBV DNA level is pending from today.      In terms of the ingestion, as I mentioned above, it is a little bit hard  to sort out what it relates to.  Even though Norco and ibuprofen are listed on her medication list, she is taking very little of that, and that was when she broke her ankle.        I will see what her ultrasound shows and if the symptoms worsen, we will then initiate a different workup.  It sounds as though her primary complaint is dyspepsia and thus, I will try Prilosec for 2 weeks p.r.n.      Otherwise, my plan will be to see her back in 6 months.      Thank you very much for allowing me to participate in the care of this patient.  If you have any questions regarding my recommendations, please do not hesitate to contact me.           Catracho Hollis MD

## 2020-01-02 NOTE — PROGRESS NOTES
"I had the pleasure of seeing MARIANA Hoffmann for followup in the Liver Clinic at the [facility] on 01/02/2020.      Ms. Hoffmann returns for followup of chronic hepatitis B.  It has been 3 years since I saw her last in spite of the discussion with her that she needed to be seen every 6 months for ultrasound screening and blood work.  At that time she was in the immune inactive phase of hepatitis B.      She complains of what she describes as indigestion.  There is a language problem and unfortunately, her  could not speak her dialect.  Her daughter was there to help with translation, but it was very unclear what she meant by indigestion.  She did kind of point to her epigastrium when she talked about it.  Its relationship to meals could not really be established, whether it improved things or made things worse.  She tends to be a little bit constipated.  She reports her appetite remains good, and her weight has remained stable, although her weight has remained stable with her eating less.      She denies any right upper quadrant pain, itching or skin rash or fatigue.  She denies any fevers or chills, cough or shortness of breath.  She denies any nausea or vomiting.  Otherwise, there have been no other new events since she was last seen.     Current Outpatient Medications   Medication     diazepam (VALIUM) 5 MG tablet     HYDROcodone-acetaminophen (NORCO) 5-325 MG per tablet     ibuprofen (ADVIL,MOTRIN) 600 MG tablet     No current facility-administered medications for this visit.      /79   Pulse 72   Temp 97.7  F (36.5  C) (Oral)   Ht 1.575 m (5' 2\")   Wt 55.7 kg (122 lb 12.8 oz)   LMP 03/09/2013   SpO2 97%   BMI 22.46 kg/m      PHYSICAL EXAMINATION:  In general, she looks well.  HEENT exam shows no scleral icterus or temporal muscle wasting.  Her chest is clear.  Abdominal exam shows no increase in girth.  No masses or tenderness to palpation are present.  Her liver is 10 " cm in span without left lobe enlargement.  No spleen tip is palpable, and extremity exam shows no edema.  Skin exam shows no stigmata of chronic liver disease.  Neurologic exam shows no asterixis.     Recent Results (from the past 168 hour(s))   INR    Collection Time: 01/02/20  8:30 AM   Result Value Ref Range    INR 1.00 0.86 - 1.14   Hepatic panel    Collection Time: 01/02/20  8:30 AM   Result Value Ref Range    Bilirubin Direct 0.1 0.0 - 0.2 mg/dL    Bilirubin Total 0.6 0.2 - 1.3 mg/dL    Albumin 3.8 3.4 - 5.0 g/dL    Protein Total 7.9 6.8 - 8.8 g/dL    Alkaline Phosphatase 95 40 - 150 U/L    ALT 22 0 - 50 U/L    AST 20 0 - 45 U/L   Basic metabolic panel    Collection Time: 01/02/20  8:30 AM   Result Value Ref Range    Sodium 142 133 - 144 mmol/L    Potassium 3.7 3.4 - 5.3 mmol/L    Chloride 111 (H) 94 - 109 mmol/L    Carbon Dioxide 29 20 - 32 mmol/L    Anion Gap 3 3 - 14 mmol/L    Glucose 88 70 - 99 mg/dL    Urea Nitrogen 9 7 - 30 mg/dL    Creatinine 0.46 (L) 0.52 - 1.04 mg/dL    GFR Estimate >90 >60 mL/min/[1.73_m2]    GFR Estimate If Black >90 >60 mL/min/[1.73_m2]    Calcium 8.9 8.5 - 10.1 mg/dL   CBC with platelets    Collection Time: 01/02/20  8:30 AM   Result Value Ref Range    WBC 4.4 4.0 - 11.0 10e9/L    RBC Count 4.11 3.8 - 5.2 10e12/L    Hemoglobin 13.4 11.7 - 15.7 g/dL    Hematocrit 40.5 35.0 - 47.0 %    MCV 99 78 - 100 fl    MCH 32.6 26.5 - 33.0 pg    MCHC 33.1 31.5 - 36.5 g/dL    RDW 12.2 10.0 - 15.0 %    Platelet Count 249 150 - 450 10e9/L      My impression is that Ms. Hoffmann has chronic hepatitis B.  She does need an ultrasound to screen for hepatocellular carcinoma.  She will need to return in 6 months for repeat ultrasound and blood work.  I rediscussed with her and her daughter the rationale for this.  Her HBV DNA level is pending from today.      In terms of the ingestion, as I mentioned above, it is a little bit hard to sort out what it relates to.  Even though Norco and ibuprofen are listed  on her medication list, she is taking very little of that, and that was when she broke her ankle.        I will see what her ultrasound shows and if the symptoms worsen, we will then initiate a different workup.  It sounds as though her primary complaint is dyspepsia and thus, I will try Prilosec for 2 weeks p.r.n.      Otherwise, my plan will be to see her back in 6 months.      Thank you very much for allowing me to participate in the care of this patient.  If you have any questions regarding my recommendations, please do not hesitate to contact me.

## 2020-01-02 NOTE — LETTER
Date:January 3, 2020      Patient was self referred, no letter generated. Do not send.        [facility] Information

## 2020-01-02 NOTE — NURSING NOTE
"Chief Complaint   Patient presents with     RECHECK     Hep B     /79   Pulse 72   Temp 97.7  F (36.5  C) (Oral)   Ht 1.575 m (5' 2\")   Wt 55.7 kg (122 lb 12.8 oz)   LMP 03/09/2013   SpO2 97%   BMI 22.46 kg/m    Pooja Palmer, ERIC  1/2/2020 9:25 AM    "

## 2020-01-03 LAB
HBV DNA SERPL NAA+PROBE-ACNC: NORMAL [IU]/ML
HBV DNA SERPL NAA+PROBE-LOG IU: NORMAL {LOG_IU}/ML

## 2020-01-04 ENCOUNTER — ANCILLARY PROCEDURE (OUTPATIENT)
Dept: ULTRASOUND IMAGING | Facility: CLINIC | Age: 55
End: 2020-01-04
Attending: INTERNAL MEDICINE
Payer: MEDICARE

## 2020-01-04 DIAGNOSIS — B18.1 CHRONIC VIRAL HEPATITIS B WITHOUT DELTA AGENT AND WITHOUT COMA (H): ICD-10-CM

## 2020-03-02 ENCOUNTER — HEALTH MAINTENANCE LETTER (OUTPATIENT)
Age: 55
End: 2020-03-02

## 2020-12-20 ENCOUNTER — HEALTH MAINTENANCE LETTER (OUTPATIENT)
Age: 55
End: 2020-12-20

## 2021-04-18 ENCOUNTER — HEALTH MAINTENANCE LETTER (OUTPATIENT)
Age: 56
End: 2021-04-18

## 2021-10-03 ENCOUNTER — HEALTH MAINTENANCE LETTER (OUTPATIENT)
Age: 56
End: 2021-10-03

## 2022-03-20 ENCOUNTER — HEALTH MAINTENANCE LETTER (OUTPATIENT)
Age: 57
End: 2022-03-20

## 2022-05-14 ENCOUNTER — HEALTH MAINTENANCE LETTER (OUTPATIENT)
Age: 57
End: 2022-05-14

## 2022-09-04 ENCOUNTER — HEALTH MAINTENANCE LETTER (OUTPATIENT)
Age: 57
End: 2022-09-04

## 2023-04-18 DIAGNOSIS — B18.1 CHRONIC VIRAL HEPATITIS B WITHOUT DELTA AGENT AND WITHOUT COMA (H): Primary | ICD-10-CM

## 2023-04-28 DIAGNOSIS — B18.1 CHRONIC VIRAL HEPATITIS B WITHOUT DELTA AGENT AND WITHOUT COMA (H): Primary | ICD-10-CM

## 2023-06-03 ENCOUNTER — HEALTH MAINTENANCE LETTER (OUTPATIENT)
Age: 58
End: 2023-06-03

## 2024-04-28 ENCOUNTER — HEALTH MAINTENANCE LETTER (OUTPATIENT)
Age: 59
End: 2024-04-28